# Patient Record
Sex: MALE | Race: WHITE | HISPANIC OR LATINO | Employment: FULL TIME | URBAN - METROPOLITAN AREA
[De-identification: names, ages, dates, MRNs, and addresses within clinical notes are randomized per-mention and may not be internally consistent; named-entity substitution may affect disease eponyms.]

---
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
## 2021-03-24 ENCOUNTER — OFFICE VISIT (OUTPATIENT)
Dept: FAMILY MEDICINE CLINIC | Facility: CLINIC | Age: 21
End: 2021-03-24

## 2021-03-24 VITALS
DIASTOLIC BLOOD PRESSURE: 64 MMHG | HEIGHT: 69 IN | TEMPERATURE: 96.8 F | SYSTOLIC BLOOD PRESSURE: 120 MMHG | WEIGHT: 134 LBS | HEART RATE: 66 BPM | OXYGEN SATURATION: 99 % | BODY MASS INDEX: 19.85 KG/M2 | RESPIRATION RATE: 16 BRPM

## 2021-03-24 DIAGNOSIS — Z00.00 ANNUAL PHYSICAL EXAM: Primary | ICD-10-CM

## 2021-03-24 DIAGNOSIS — Z02.4 DRIVER'S PERMIT PE (PHYSICAL EXAMINATION): ICD-10-CM

## 2021-03-24 PROCEDURE — 1036F TOBACCO NON-USER: CPT | Performed by: FAMILY MEDICINE

## 2021-03-24 PROCEDURE — 99499 UNLISTED E&M SERVICE: CPT | Performed by: FAMILY MEDICINE

## 2021-03-24 PROCEDURE — 3725F SCREEN DEPRESSION PERFORMED: CPT | Performed by: FAMILY MEDICINE

## 2021-03-24 PROCEDURE — 3008F BODY MASS INDEX DOCD: CPT | Performed by: FAMILY MEDICINE

## 2021-03-24 NOTE — PATIENT INSTRUCTIONS

## 2021-03-24 NOTE — PROGRESS NOTES
[facility]    NAME: Hu Burkett  AGE: 24 y o  SEX: male  : 2000     DATE: 3/29/2021     Assessment and Plan:     Problem List Items Addressed This Visit     None      Visit Diagnoses     Annual physical exam    -  Primary    Body mass index (BMI) of 19 0 to 19 9 in adult        's permit PE (physical examination)          Form filled out and provided to patient  Immunizations and preventive care screenings were discussed with patient today  Appropriate education was printed on patient's after visit summary  Counseling:  Alcohol/drug use: discussed no alcohol intake while underage, and avoidance of illicit drug use  Dental Health: discussed importance of regular tooth brushing, flossing, and 2x/ year dental visits  Injury prevention: discussed safety/seat belts, safety helmets, smoke detectors, carbon dioxide detectors, and smoking near bedding or upholstery  Sexual health: discussed sexually transmitted diseases, partner selection, use of condoms, avoidance of unintended pregnancy, and contraceptive alternatives  · Exercise: the importance of regular exercise/physical activity was discussed  Recommend exercise 3-5 times per week for at least 30 minutes  Return in about 1 year (around 3/24/2022) for Annual Wellness Visit  Chief Complaint:     Chief Complaint   Patient presents with    Physical Exam     drivers license      History of Present Illness:     Adult Annual Physical   Patient here for a comprehensive physical exam  The patient reports no problems  Diet and Physical Activity  · Diet/Nutrition: water, rice, beans, chicken predominantly  · Exercise: no formal exercise  Walks at work at [employer]       Depression Screening  PHQ-9 Depression Screening    PHQ-9:   Frequency of the following problems over the past two weeks:      Little interest or pleasure in doing things: 0 - not at all  Feeling down, depressed, or hopeless: 0 - not at all  PHQ-2 Score: 0       General Health  · Sleep: sleeps well and gets 7-8 hours of sleep on average  · Hearing: normal - bilateral   · Vision: no vision problems  · Dental: no dental visits for >1 year, brushes teeth twice daily and flosses teeth occasionally   Health  · No girlfriend, not sexually active  · History of STDs?: no   · Urination normal, BMs 2x / roberth     Review of Systems:     Review of Systems   Constitutional: Negative for chills, fatigue and fever  HENT: Negative for congestion and sore throat  Eyes: Negative for pain and redness  Respiratory: Negative for cough and shortness of breath  Cardiovascular: Negative for chest pain and palpitations  Gastrointestinal: Negative for diarrhea, nausea and vomiting  Endocrine: Negative for cold intolerance and heat intolerance  Genitourinary: Negative for decreased urine volume, frequency and urgency  Musculoskeletal: Negative for arthralgias and back pain  Skin: Negative for rash and wound  Neurological: Negative for dizziness and headaches  Psychiatric/Behavioral: Negative for dysphoric mood and suicidal ideas        Past Medical History:     Past Medical History:   Diagnosis Date    Heart murmur     Hypoglycemia       Past Surgical History:     Past Surgical History:   Procedure Laterality Date    KNEE ASPIRATION  2013      Social History:     E-Cigarette/Vaping    E-Cigarette Use Never User      E-Cigarette/Vaping Substances    Nicotine No     THC No     CBD No     Flavoring No     Other No     Unknown No      Social History     Socioeconomic History    Marital status: Single     Spouse name: None    Number of children: None    Years of education: None    Highest education level: None   Occupational History    None   Social Needs    Financial resource strain: Not hard at all   [email or web address] insecurity     Worry: Never true     Inability: Never true    Transportation needs     Medical: No     Non-medical: No   Tobacco Use    Smoking status: Passive Smoke Exposure - Never Smoker    Smokeless tobacco: Never Used   Substance and Sexual Activity    Alcohol use: No    Drug use: No    Sexual activity: Not Currently     Partners: Female   Lifestyle    Physical activity     Days per week: None     Minutes per session: None    Stress: None   Relationships    Social connections     Talks on phone: None     Gets together: None     Attends Buddhism service: None     Active member of club or organization: None     Attends meetings of clubs or organizations: None     Relationship status: None    Intimate partner violence     Fear of current or ex partner: None     Emotionally abused: None     Physically abused: None     Forced sexual activity: None   Other Topics Concern    None   Social History Narrative    None      Family History:     Family History   Problem Relation Age of Onset    Seizures Mother     Diabetes Mother     Heart murmur Father     Hypertension Father     Diabetes Father     Diabetes Maternal Grandfather     Diabetes Paternal Grandfather     Cancer Cousin       Current Medications:     No current outpatient medications on file  No current facility-administered medications for this visit  Allergies:     No Known Allergies   Physical Exam:     /64 (BP Location: Left arm, Patient Position: Sitting, Cuff Size: Standard)   Pulse 66   Temp (!) 96 8 °F (36 °C) (Temporal)   Resp 16   Ht 5' 9" (1 753 m)   Wt 60 8 kg (134 lb)   SpO2 99%   BMI 19 79 kg/m²     Physical Exam  Vitals signs reviewed  Constitutional:       General: He is not in acute distress  Appearance: Normal appearance  He is not ill-appearing  Comments: Thin for age   HENT:      Head: Normocephalic and atraumatic  Right Ear: Tympanic membrane, ear canal and external ear normal  There is no impacted cerumen        Left Ear: Tympanic membrane, ear canal and external ear normal  There is no impacted cerumen  Nose: Nose normal  No congestion or rhinorrhea  Mouth/Throat:      Mouth: Mucous membranes are moist       Pharynx: Oropharynx is clear  No oropharyngeal exudate or posterior oropharyngeal erythema  Eyes:      General: No scleral icterus  Right eye: No discharge  Left eye: No discharge  Conjunctiva/sclera: Conjunctivae normal    Neck:      Musculoskeletal: Normal range of motion and neck supple  No neck rigidity  Cardiovascular:      Rate and Rhythm: Normal rate and regular rhythm  Pulses: Normal pulses  Heart sounds: Normal heart sounds  No murmur  No friction rub  No gallop  Pulmonary:      Effort: Pulmonary effort is normal  No respiratory distress  Breath sounds: Normal breath sounds  No stridor  No wheezing  Abdominal:      General: Abdomen is flat  Bowel sounds are normal  There is no distension  Palpations: Abdomen is soft  There is no mass  Tenderness: There is no abdominal tenderness  There is no right CVA tenderness or left CVA tenderness  Musculoskeletal: Normal range of motion  General: No swelling or tenderness  Right lower leg: No edema  Left lower leg: No edema  Lymphadenopathy:      Cervical: No cervical adenopathy  Skin:     General: Skin is warm and dry  Capillary Refill: Capillary refill takes less than 2 seconds  Coloration: Skin is not jaundiced or pale  Neurological:      Mental Status: He is alert and oriented to person, place, and time  Coordination: Coordination normal       Gait: Gait normal    Psychiatric:         Mood and Affect: Mood normal          Behavior: Behavior normal          Thought Content:  Thought content normal          Judgment: Judgment normal           Gael Corey DO   [address] [place] [address]

## 2022-01-02 ENCOUNTER — HOSPITAL ENCOUNTER (EMERGENCY)
Facility: HOSPITAL | Age: 22
Discharge: HOME | End: 2022-01-02
Attending: EMERGENCY MEDICINE
Payer: COMMERCIAL

## 2022-01-02 VITALS
DIASTOLIC BLOOD PRESSURE: 98 MMHG | HEART RATE: 103 BPM | RESPIRATION RATE: 18 BRPM | BODY MASS INDEX: 19.26 KG/M2 | OXYGEN SATURATION: 98 % | SYSTOLIC BLOOD PRESSURE: 160 MMHG | WEIGHT: 130 LBS | TEMPERATURE: 98.9 F | HEIGHT: 69 IN

## 2022-01-02 DIAGNOSIS — Z11.52 ENCOUNTER FOR SCREENING FOR COVID-19: ICD-10-CM

## 2022-01-02 DIAGNOSIS — B34.9 VIRAL ILLNESS: Primary | ICD-10-CM

## 2022-01-02 PROCEDURE — 87636 SARSCOV2 & INF A&B AMP PRB: CPT | Performed by: EMERGENCY MEDICINE

## 2022-01-02 PROCEDURE — 99284 EMERGENCY DEPT VISIT MOD MDM: CPT | Performed by: EMERGENCY MEDICINE

## 2022-01-02 PROCEDURE — 99281 EMR DPT VST MAYX REQ PHY/QHP: CPT

## 2022-01-02 NOTE — ED ATTENDING ATTESTATION
1/2/2022  Robby Cardoza DO, saw and evaluated the patient  I have discussed the patient with the resident/non-physician practitioner and agree with the resident's/non-physician practitioner's findings, Plan of Care, and MDM as documented in the resident's/non-physician practitioner's note, except where noted  All available labs and Radiology studies were reviewed  I was present for key portions of any procedure(s) performed by the resident/non-physician practitioner and I was immediately available to provide assistance  At this point I agree with the current assessment done in the Emergency Department  I have conducted an independent evaluation of this patient a history and physical is as follows:      23 yo male c/o 2d hx fever, cough, sore throat  Vaccinated x 3 against COVID  Needs swab for work  SpO2 98% in RA    Will send multiplex swab, call with results  Isolate in the meantime        ED Course         Critical Care Time  Procedures

## 2022-01-02 NOTE — ED PROVIDER NOTES
History  Chief Complaint   Patient presents with    Labs Only     Pt c/o fever and sore throat  Needs swab for his job  HPI: Patient is a 22 y o  male who presents with 2 days of fever, cough and sore throat which the patient describes as mild  The cough is a dry cough  Patient reports subjective fevers, but has not taken a temperature  The patient has had contact with people with similar symptoms  The patient has not taken any medication  Patient received 3 COVID vaccinations and no flu vaccination  No Known Allergies    Past Medical History:  No date: Heart murmur  No date: Hypoglycemia   Past Surgical History:  2013: KNEE ASPIRATION  Social History    Tobacco Use      Smoking status: Passive Smoke Exposure - Never Smoker      Smokeless tobacco: Never Used    Vaping Use      Vaping Use: Never used    Alcohol use: Yes    Drug use: No      Nursing notes reviewed  Physical Exam:  ED Triage Vitals (01/02/22 0102)  Temperature: 98 9 °F (37 2 °C)  Pulse: 103  Respirations: 18  Blood Pressure: 160/98  SpO2: 98 %  Temp Source: Oral  Heart Rate Source: Monitor  Patient Position - Orthostatic VS: Sitting  BP Location: Left arm  FiO2 (%): n/a  Pain Score: 5    ROS: Positive for fever, cough, and sore throat, the remainder of a 10 organ system ROS was otherwise unremarkable  General: awake, alert, no acute distress  Head: normocephalic, atraumatic  Eyes: no scleral icterus  Ears: external ears normal, hearing grossly intact  Nose: external exam grossly normal, negative nasal discharge  Neck: symmetric, No JVD noted, trachea midline, erythema of throat noted, no exudate  Pulmonary: no respiratory distress, no tachypnea noted  Cardiovascular: appears well perfused  Abdomen: no distention noted  Musculoskeletal: no deformities noted, tone normal  Neuro: grossly non-focal  Psych: mood and affect appropriate    The patient is stable and has a history and physical exam consistent with a viral illness   COVID19/flu testing has been performed  I considered the patient's other medical conditions as applicable/noted above in my medical decision making  The patient is stable upon discharge  The plan is for supportive care at home  Patient was given Decadron  The patient (and any family present) verbalized understanding of the discharge instructions and warnings that would necessitate return to the Emergency Department  All questions were answered prior to discharge  Medications  dexamethasone oral liquid 10 mg 1 mL (has no administration in time range)            None       Past Medical History:   Diagnosis Date    Heart murmur     Hypoglycemia        Past Surgical History:   Procedure Laterality Date    KNEE ASPIRATION  2013       Family History   Problem Relation Age of Onset    Seizures Mother     Diabetes Mother     Heart murmur Father     Hypertension Father     Diabetes Father     Diabetes Maternal Grandfather     Diabetes Paternal Grandfather     Cancer Cousin      I have reviewed and agree with the history as documented  E-Cigarette/Vaping    E-Cigarette Use Never User      E-Cigarette/Vaping Substances    Nicotine No     THC No     CBD No     Flavoring No     Other No     Unknown No      Social History     Tobacco Use    Smoking status: Passive Smoke Exposure - Never Smoker    Smokeless tobacco: Never Used   Vaping Use    Vaping Use: Never used   Substance Use Topics    Alcohol use:  Yes    Drug use: No        Review of Systems    Physical Exam  ED Triage Vitals [01/02/22 0102]   Temperature Pulse Respirations Blood Pressure SpO2   98 9 °F (37 2 °C) 103 18 160/98 98 %      Temp Source Heart Rate Source Patient Position - Orthostatic VS BP Location FiO2 (%)   Oral Monitor Sitting Left arm --      Pain Score       5             Orthostatic Vital Signs  Vitals:    01/02/22 0102   BP: 160/98   Pulse: 103   Patient Position - Orthostatic VS: Sitting       Physical Exam    ED Medications  Medications   dexamethasone oral liquid 10 mg 1 mL (has no administration in time range)       Diagnostic Studies  Results Reviewed     Procedure Component Value Units Date/Time    COVID/FLU - 24 hour TAT [[id number]] Updated: 01/02/22 0233    Lab Status: In process Specimen: Nares from Nose                  No orders to display         Procedures  Procedures      ED Course                                       MDM  Number of Diagnoses or Management Options  Encounter for screening for COVID-19: established and improving  Viral illness: established and improving     Amount and/or Complexity of Data Reviewed  Clinical lab tests: ordered and reviewed  Review and summarize past medical records: yes  Discuss the patient with other providers: yes    Risk of Complications, Morbidity, and/or Mortality  Presenting problems: low  Diagnostic procedures: low  Management options: low    Patient Progress  Patient progress: improved    Recommend supportive care at home  Follow-up with PCP  Return precautions given  Patient was given information on quarantine if his COVID test does come back positive  Disposition  Final diagnoses:   Viral illness   Encounter for screening for COVID-19     Time reflects when diagnosis was documented in both MDM as applicable and the Disposition within this note     Time User Action Codes Description Comment    1/2/2022  2:34 AM Kyle Harrington Add [B34 9] Viral illness     1/2/2022  2:34 AM Kyle Harrington Add [Z11 52] Encounter for screening for COVID-19       ED Disposition     ED Disposition Condition Date/Time Comment    Discharge Good Sun Jan 2, 2022  2:34 AM [address] 219 discharge to home/self care              Follow-up Information     Follow up With Specialties Details Why Contact Info Additional [address]   [address], [address] [facility] [address]  [facility] [address] [facility], [address], [address] [place], [place], [address]          There are no discharge medications for this patient  No discharge procedures on file  PDMP Review     None           ED Provider  Attending physically available and evaluated Corazon Brandon I managed the patient along with the ED Attending      Electronically Signed by         Veena Hargrove DO  01/02/22 5459

## 2022-01-02 NOTE — DISCHARGE INSTRUCTIONS
You have been seen for a viral illness  You should return to the ED if you develop trouble breathing, dehydration, or other worsening symptoms  Follow up with your primary care physician  Take Motrin or Advil for symptoms  If your COVID test is positive, you will need to quarantine for 5 days beginning when your symptoms started and then can leave your house with a mask for the next 5 days if you have been fever free for 24 hours

## 2022-01-02 NOTE — Clinical Note
Gaurav Murillo was seen and treated in our emergency department on 1/2/2022  Diagnosis:     Dayanna Rodriges  may return to work on return date  He may return on this date: 01/06/2022    Patient may return to work on this day if he has been fever free for 24 hours  He must wear mask for the next 5 days  If you have any questions or concerns, please don't hesitate to call        Thierno Check, DO    ______________________________           _______________          _______________  Hospital Representative                              Date                                Time

## 2022-01-06 LAB
FLUAV RNA RESP QL NAA+PROBE: NEGATIVE
FLUBV RNA RESP QL NAA+PROBE: NEGATIVE
SARS-COV-2 RNA RESP QL NAA+PROBE: NEGATIVE

## 2022-01-08 ENCOUNTER — OFFICE VISIT (OUTPATIENT)
Dept: URGENT CARE | Age: 22
End: 2022-01-08
Payer: COMMERCIAL

## 2022-01-08 VITALS
WEIGHT: 130 LBS | HEIGHT: 69 IN | TEMPERATURE: 98.3 F | RESPIRATION RATE: 20 BRPM | BODY MASS INDEX: 19.26 KG/M2 | OXYGEN SATURATION: 99 % | HEART RATE: 101 BPM

## 2022-01-08 DIAGNOSIS — J02.9 ACUTE PHARYNGITIS, UNSPECIFIED ETIOLOGY: ICD-10-CM

## 2022-01-08 DIAGNOSIS — J32.9 SINUSITIS, UNSPECIFIED CHRONICITY, UNSPECIFIED LOCATION: Primary | ICD-10-CM

## 2022-01-08 DIAGNOSIS — Z20.822 ENCOUNTER FOR LABORATORY TESTING FOR COVID-19 VIRUS: ICD-10-CM

## 2022-01-08 PROCEDURE — 99213 OFFICE O/P EST LOW 20 MIN: CPT | Performed by: PHYSICIAN ASSISTANT

## 2022-01-08 PROCEDURE — U0003 INFECTIOUS AGENT DETECTION BY NUCLEIC ACID (DNA OR RNA); SEVERE ACUTE RESPIRATORY SYNDROME CORONAVIRUS 2 (SARS-COV-2) (CORONAVIRUS DISEASE [COVID-19]), AMPLIFIED PROBE TECHNIQUE, MAKING USE OF HIGH THROUGHPUT TECHNOLOGIES AS DESCRIBED BY CMS-2020-01-R: HCPCS | Performed by: PHYSICIAN ASSISTANT

## 2022-01-08 PROCEDURE — U0005 INFEC AGEN DETEC AMPLI PROBE: HCPCS | Performed by: PHYSICIAN ASSISTANT

## 2022-01-08 RX ORDER — AMOXICILLIN 500 MG/1
500 CAPSULE ORAL EVERY 8 HOURS SCHEDULED
Qty: 30 CAPSULE | Refills: 0 | Status: SHIPPED | OUTPATIENT
Start: 2022-01-08 | End: 2022-01-18

## 2022-01-08 NOTE — PROGRESS NOTES
[facility]        NAME: Yoko Vera is a 22 y o  male  : 2000    MRN: [id number]  DATE: 2022  TIME: 8:41 AM    Pulse 101   Temp 98 3 °F (36 8 °C) (Temporal)   Resp 20   Ht 5' 9" (1 753 m)   Wt 59 kg (130 lb)   SpO2 99%   BMI 19 20 kg/m²     Assessment and Plan   Sinusitis, unspecified chronicity, unspecified location [J32 9]  1  Sinusitis, unspecified chronicity, unspecified location  COVID Only -Office Collect    amoxicillin (AMOXIL) 500 mg capsule   2  Acute pharyngitis, unspecified etiology  amoxicillin (AMOXIL) 500 mg capsule   3  Encounter for laboratory testing for COVID-19 virus  amoxicillin (AMOXIL) 500 mg capsule         Patient Instructions       Follow up with PCP in 3-5 days  Proceed to  ER if symptoms worsen  Chief Complaint     Chief Complaint   Patient presents with    Sore Throat     sore throat, fever, cough x 2 weeks         History of Present Illness       Pt with sore throat and nasal congestion getting worse over several weeks       Review of Systems   Review of Systems   Constitutional: Negative  HENT: Positive for congestion, sinus pressure, sinus pain and sore throat  Eyes: Negative  Respiratory: Negative  Cardiovascular: Negative  Gastrointestinal: Negative  Endocrine: Negative  Genitourinary: Negative  Musculoskeletal: Negative  Skin: Negative  Allergic/Immunologic: Negative  Neurological: Negative  Hematological: Negative  Psychiatric/Behavioral: Negative  All other systems reviewed and are negative          Current Medications       Current Outpatient Medications:     amoxicillin (AMOXIL) 500 mg capsule, Take 1 capsule (500 mg total) by mouth every 8 (eight) hours for 10 days, Disp: 30 capsule, Rfl: 0    Current Allergies     Allergies as of 2022    (No Known Allergies)            The following portions of the patient's history were reviewed and updated as appropriate: allergies, current medications, past family history, past medical history, past social history, past surgical history and problem list      Past Medical History:   Diagnosis Date    Heart murmur     Hypertension     Hypoglycemia        Past Surgical History:   Procedure Laterality Date    KNEE ASPIRATION  2013       Family History   Problem Relation Age of Onset    Seizures Mother     Diabetes Mother     Heart murmur Father     Hypertension Father     Diabetes Father     Diabetes Maternal Grandfather     Diabetes Paternal Grandfather     Cancer Cousin          Medications have been verified  Objective   Pulse 101   Temp 98 3 °F (36 8 °C) (Temporal)   Resp 20   Ht 5' 9" (1 753 m)   Wt 59 kg (130 lb)   SpO2 99%   BMI 19 20 kg/m²        Physical Exam     Physical Exam  Vitals and nursing note reviewed  Constitutional:       Appearance: Normal appearance  He is normal weight  HENT:      Head: Normocephalic and atraumatic  Right Ear: Tympanic membrane, ear canal and external ear normal       Left Ear: Tympanic membrane, ear canal and external ear normal       Nose: Congestion and rhinorrhea present  Comments: Boggy nasal mucosa  Max sinus tender to palp     Mouth/Throat:      Pharynx: Posterior oropharyngeal erythema present  Eyes:      Extraocular Movements: Extraocular movements intact  Conjunctiva/sclera: Conjunctivae normal       Pupils: Pupils are equal, round, and reactive to light  Cardiovascular:      Rate and Rhythm: Normal rate and regular rhythm  Pulses: Normal pulses  Heart sounds: Normal heart sounds  Pulmonary:      Effort: Pulmonary effort is normal       Breath sounds: Normal breath sounds  Abdominal:      General: Abdomen is flat  Palpations: Abdomen is soft  Musculoskeletal:         General: Normal range of motion  Cervical back: Normal range of motion and neck supple  Lymphadenopathy:      Cervical: Cervical adenopathy present     Skin: General: Skin is warm  Capillary Refill: Capillary refill takes less than 2 seconds  Neurological:      General: No focal deficit present  Mental Status: He is alert and oriented to person, place, and time     Psychiatric:         Mood and Affect: Mood normal          Behavior: Behavior normal

## 2022-01-08 NOTE — LETTER
January 8, 2022     Patient: Talita Smart   YOB: 2000   Date of Visit: 1/8/2022       To Whom It May Concern: It is my medical opinion that Ember Spare should remain out of work until test results are back and are negative   If you have any questions or concerns, please don't hesitate to call           Sincerely,        Brandt Alfaro PA-C    CC: No Recipients

## 2022-01-08 NOTE — PATIENT INSTRUCTIONS
[address]    Your healthcare provider and/or public health staff have evaluated you and have determined that you do not need to remain in the hospital at this time  At this time you can be isolated at home where you will be monitored by staff from your local or state health department  You should carefully follow the prevention and isolation steps below until a healthcare provider or local or state health department says that you can return to your normal activities  Stay home except to get medical care    People who are mildly ill with COVID-19 are able to isolate at home during their illness  You should restrict activities outside your home, except for getting medical care  Do not go to work, school, or public areas  Avoid using public transportation, ride-sharing, or taxis  Separate yourself from other people and animals in your home    People: As much as possible, you should stay in a specific room and away from other people in your home  Also, you should use a separate bathroom, if available  Animals: You should restrict contact with pets and other animals while you are sick with COVID-19, just like you would around other people  Although there have not been reports of pets or other animals becoming sick with COVID-19, it is still recommended that people sick with COVID-19 limit contact with animals until more information is known about the virus  When possible, have another member of your household care for your animals while you are sick  If you are sick with COVID-19, avoid contact with your pet, including petting, snuggling, being kissed or licked, and sharing food  If you must care for your pet or be around animals while you are sick, wash your hands before and after you interact with pets and wear a facemask  See COVID-19 and Animals for more information      Call ahead before visiting your doctor    If you have a medical appointment, call the healthcare provider and tell them that you have or may have COVID-19  This will help the healthcare providers office take steps to keep other people from getting infected or exposed  Wear a facemask    You should wear a facemask when you are around other people (e g , sharing a room or vehicle) or pets and before you enter a healthcare providers office  If you are not able to wear a facemask (for example, because it causes trouble breathing), then people who live with you should not stay in the same room with you, or they should wear a facemask if they enter your room  Cover your coughs and sneezes    Cover your mouth and nose with a tissue when you cough or sneeze  Throw used tissues in a lined trash can  Immediately wash your hands with soap and water for at least 20 seconds or, if soap and water are not available, clean your hands with an alcohol-based hand  that contains at least 60% alcohol  Clean your hands often    Wash your hands often with soap and water for at least 20 seconds, especially after blowing your nose, coughing, or sneezing; going to the bathroom; and before eating or preparing food  If soap and water are not readily available, use an alcohol-based hand  with at least 60% alcohol, covering all surfaces of your hands and rubbing them together until they feel dry  Soap and water are the best option if hands are visibly dirty  Avoid touching your eyes, nose, and mouth with unwashed hands  Avoid sharing personal household items    You should not share dishes, drinking glasses, cups, eating utensils, towels, or bedding with other people or pets in your home  After using these items, they should be washed thoroughly with soap and water  Clean all high-touch surfaces everyday    High touch surfaces include counters, tabletops, doorknobs, bathroom fixtures, toilets, phones, keyboards, tablets, and bedside tables  Also, clean any surfaces that may have blood, stool, or body fluids on them   Use a household cleaning spray or wipe, according to the label instructions  Labels contain instructions for safe and effective use of the cleaning product including precautions you should take when applying the product, such as wearing gloves and making sure you have good ventilation during use of the product  Monitor your symptoms    Seek prompt medical attention if your illness is worsening (e g , difficulty breathing)  Before seeking care, call your healthcare provider and tell them that you have, or are being evaluated for, COVID-19  Put on a facemask before you enter the facility  These steps will help the healthcare providers office to keep other people in the office or waiting room from getting infected or exposed  Ask your healthcare provider to call the local or [facility] health department  Persons who are placed under active monitoring or facilitated self-monitoring should follow instructions provided by their local health department or occupational health professionals, as appropriate  If you have a medical emergency and need to call 911, notify the dispatch personnel that you have, or are being evaluated for COVID-19  If possible, put on a facemask before emergency medical services arrive      Discontinuing home isolation    Patients with confirmed COVID-19 should remain under home isolation precautions until the following conditions are met:   - They have had no fever for at least 24 hours (that is one full day of no fever without the use medicine that reduces fevers)  AND  - other symptoms have improved (for example, when their cough or shortness of breath have improved)  AND  - If had mild or moderate illness, at least 10 days have passed since their symptoms first appeared or if severe illness (needed oxygen) or immunosuppressed, at least 20 days have passed since symptoms first appeared  Patients with confirmed COVID-19 should also notify close contacts (including their workplace) and ask that they self-quarantine  Currently, close contact is defined as being within 6 feet for 15 minutes or more from the period 24 hours starting 48 hours before symptom onset to the time at which the patient went into isolation  Close contacts of patients diagnosed with COVID-19 should be instructed by the patient to self-quarantine for 14 days from the last time of their last contact with the patient  Source: [employer]  Sinusitis   WHAT YOU NEED TO KNOW:   Sinusitis is inflammation or infection of your sinuses  Sinusitis is most often caused by a virus  Acute sinusitis may last up to 12 weeks  Chronic sinusitis lasts longer than 12 weeks  Recurrent sinusitis means you have 4 or more infections in 1 year  DISCHARGE INSTRUCTIONS:   Return to the emergency department if:   · You have trouble breathing or wheezing that is getting worse  · You have a stiff neck, a fever, or a bad headache  · You cannot open your eye  · Your eyeball bulges out or you cannot move your eye  · You are more sleepy than normal, or you notice changes in your ability to think, move, or talk  · You have swelling of your forehead or scalp  Call your doctor if:   · You have vision changes, such as double vision  · Your eye and eyelid are red, swollen, and painful  · Your symptoms do not improve or go away after 10 days  · You have nausea and are vomiting  · Your nose is bleeding  · You have questions or concerns about your condition or care  Medicines: Your symptoms may go away on their own  Your healthcare provider may recommend watchful waiting for up to 10 days before starting antibiotics  You may need any of the following:  · Acetaminophen  decreases pain and fever  It is available without a doctor's order  Ask how much to take and how often to take it  Follow directions   Read the labels of all other medicines you are using to see if they also contain acetaminophen, or ask your doctor or pharmacist  Acetaminophen can cause liver damage if not taken correctly  Do not use more than 4 grams (4,000 milligrams) total of acetaminophen in one day  · NSAIDs , such as ibuprofen, help decrease swelling, pain, and fever  This medicine is available with or without a doctor's order  NSAIDs can cause stomach bleeding or kidney problems in certain people  If you take blood thinner medicine, always ask your healthcare provider if NSAIDs are safe for you  Always read the medicine label and follow directions  · Nasal steroid sprays  may help decrease inflammation in your nose and sinuses  · Decongestants  help reduce swelling and drain mucus in the nose and sinuses  They may help you breathe easier  · Antihistamines  help dry mucus in the nose and relieve sneezing  · Antibiotics  help treat or prevent a bacterial infection  · Take your medicine as directed  Contact your healthcare provider if you think your medicine is not helping or if you have side effects  Tell him or her if you are allergic to any medicine  Keep a list of the medicines, vitamins, and herbs you take  Include the amounts, and when and why you take them  Bring the list or the pill bottles to follow-up visits  Carry your medicine list with you in case of an emergency  Self-care:   · Rinse your sinuses as directed  Use a sinus rinse device to rinse your nasal passages with a saline (salt water) solution or distilled water  Do not use tap water  This will help thin the mucus in your nose and rinse away pollen and dirt  It will also help reduce swelling so you can breathe normally  · Use a humidifier  to increase air moisture in your home  This may make it easier for you to breathe and help decrease your cough  · Sleep with your head elevated  Place an extra pillow under your head before you go to sleep to help your sinuses drain  · Drink liquids as directed    Ask your healthcare provider how much liquid to drink each day and which liquids are best for you  Liquids will thin the mucus in your nose and help it drain  Avoid drinks that contain alcohol or caffeine  · Do not smoke, and avoid secondhand smoke  Nicotine and other chemicals in cigarettes and cigars can make your symptoms worse  Ask your healthcare provider for information if you currently smoke and need help to quit  E-cigarettes or smokeless tobacco still contain nicotine  Talk to your healthcare provider before you use these products  Prevent the spread of germs:   · Wash your hands often with soap and water  Wash your hands after you use the bathroom, change a child's diaper, or sneeze  Wash your hands before you prepare or eat food  · Stay away from people who are sick  Some germs spread easily and quickly through contact  Follow up with your doctor as directed: You may be referred to an ear, nose, and throat specialist  Write down your questions so you remember to ask them during your visits  © Copyright [employer] 2021 Information is for End User's use only and may not be sold, redistributed or otherwise used for commercial purposes  All illustrations and images included in CareNotes® are the copyrighted property of A D A M , Inc  or [facility]   The above information is an  only  It is not intended as medical advice for individual conditions or treatments  Talk to your doctor, nurse or pharmacist before following any medical regimen to see if it is safe and effective for you  Pharyngitis   WHAT YOU NEED TO KNOW:   Pharyngitis, or sore throat, is inflammation of the tissues and structures in your pharynx (throat)  Pharyngitis is most often caused by bacteria  It may also be caused by a cold or flu virus  Other causes include smoking, allergies, or acid reflux     DISCHARGE INSTRUCTIONS:   Call 911 for any of the following:   · You have trouble breathing or swallowing because your throat is swollen or sore  Return to the emergency department if:   · You are drooling because it hurts too much to swallow  · Your fever is higher than 102? F (39?C) or lasts longer than 3 days  · You are confused  · You taste blood in your throat  Contact your healthcare provider if:   · Your throat pain gets worse  · You have a painful lump in your throat that does not go away after 5 days  · Your symptoms do not improve after 5 days  · You have questions or concerns about your condition or care  Medicines:  Viral pharyngitis will go away on its own without treatment  Your sore throat should start to feel better in 3 to 5 days for both viral and bacterial infections  You may need any of the following:  · Antibiotics  treat a bacterial infection  · NSAIDs , such as ibuprofen, help decrease swelling, pain, and fever  NSAIDs can cause stomach bleeding or kidney problems in certain people  If you take blood thinner medicine, always ask your healthcare provider if NSAIDs are safe for you  Always read the medicine label and follow directions  · Acetaminophen  decreases pain and fever  It is available without a doctor's order  Ask how much to take and how often to take it  Follow directions  Acetaminophen can cause liver damage if not taken correctly  · Take your medicine as directed  Contact your healthcare provider if you think your medicine is not helping or if you have side effects  Tell him or her if you are allergic to any medicine  Keep a list of the medicines, vitamins, and herbs you take  Include the amounts, and when and why you take them  Bring the list or the pill bottles to follow-up visits  Carry your medicine list with you in case of an emergency  Manage your symptoms:   · Gargle salt water  Mix ¼ teaspoon salt in an 8 ounce glass of warm water and gargle  This may help decrease swelling in your throat  · Drink liquids as directed    You may need to drink more liquids than usual  Liquids may help soothe your throat and prevent dehydration  Ask how much liquid to drink each day and which liquids are best for you  · Use a cool-steam humidifier  to help moisten the air in your room and calm your cough  · Soothe your throat  with cough drops, ice, soft foods, or popsicles  Prevent the spread of pharyngitis:  Cover your mouth and nose when you cough or sneeze  Do not share food or drinks  Wash your hands often  Use soap and water  If soap and water are unavailable, use an alcohol based hand   Follow up with your doctor as directed:  Write down your questions so you remember to ask them during your visits  © Copyright [employer] 2021 Information is for End User's use only and may not be sold, redistributed or otherwise used for commercial purposes  All illustrations and images included in CareNotes® are the copyrighted property of A D A M , Inc  or [facility] Radha Sherman   The above information is an  only  It is not intended as medical advice for individual conditions or treatments  Talk to your doctor, nurse or pharmacist before following any medical regimen to see if it is safe and effective for you

## 2022-01-10 LAB — SARS-COV-2 RNA RESP QL NAA+PROBE: NEGATIVE

## 2022-02-08 ENCOUNTER — HOSPITAL ENCOUNTER (EMERGENCY)
Facility: HOSPITAL | Age: 22
Discharge: HOME | End: 2022-02-08
Attending: EMERGENCY MEDICINE | Admitting: EMERGENCY MEDICINE
Payer: COMMERCIAL

## 2022-02-08 ENCOUNTER — APPOINTMENT (EMERGENCY)
Dept: RADIOLOGY | Facility: HOSPITAL | Age: 22
End: 2022-02-08
Payer: COMMERCIAL

## 2022-02-08 VITALS
HEART RATE: 88 BPM | DIASTOLIC BLOOD PRESSURE: 76 MMHG | OXYGEN SATURATION: 99 % | RESPIRATION RATE: 20 BRPM | TEMPERATURE: 97.9 F | SYSTOLIC BLOOD PRESSURE: 142 MMHG
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
## 2022-02-08 DIAGNOSIS — M25.512 LEFT SHOULDER PAIN: ICD-10-CM

## 2022-02-08 DIAGNOSIS — Z20.822 ENCOUNTER FOR SCREENING LABORATORY TESTING FOR COVID-19 VIRUS IN ASYMPTOMATIC PATIENT: ICD-10-CM

## 2022-02-08 PROCEDURE — U0003 INFECTIOUS AGENT DETECTION BY NUCLEIC ACID (DNA OR RNA); SEVERE ACUTE RESPIRATORY SYNDROME CORONAVIRUS 2 (SARS-COV-2) (CORONAVIRUS DISEASE [COVID-19]), AMPLIFIED PROBE TECHNIQUE, MAKING USE OF HIGH THROUGHPUT TECHNOLOGIES AS DESCRIBED BY CMS-2020-01-R: HCPCS | Performed by: INTERNAL MEDICINE

## 2022-02-08 PROCEDURE — 93005 ELECTROCARDIOGRAM TRACING: CPT

## 2022-02-08 PROCEDURE — 99284 EMERGENCY DEPT VISIT MOD MDM: CPT

## 2022-02-08 PROCEDURE — U0005 INFEC AGEN DETEC AMPLI PROBE: HCPCS | Performed by: INTERNAL MEDICINE

## 2022-02-08 PROCEDURE — 73030 X-RAY EXAM OF SHOULDER: CPT

## 2022-02-08 PROCEDURE — 99285 EMERGENCY DEPT VISIT HI MDM: CPT | Performed by: EMERGENCY MEDICINE

## 2022-02-08 RX ORDER — LIDOCAINE 50 MG/G
1 PATCH TOPICAL ONCE
Status: DISCONTINUED | OUTPATIENT
Start: 2022-02-08 | End: 2022-02-09 | Stop reason: HOSPADM

## 2022-02-08 RX ORDER — IBUPROFEN 600 MG/1
600 TABLET ORAL ONCE
Status: COMPLETED | OUTPATIENT
Start: 2022-02-08 | End: 2022-02-08

## 2022-02-08 RX ADMIN — IBUPROFEN 600 MG: 600 TABLET, FILM COATED ORAL at 21:32

## 2022-02-08 RX ADMIN — LIDOCAINE 5% 1 PATCH: 700 PATCH TOPICAL at 21:33

## 2022-02-08 NOTE — Clinical Note
Ember Valencia was seen and treated in our emergency department on 2/8/2022  Diagnosis:     Carlo Kylejamesshakeel    He may return on this date:     Patient was tested for COVID on 2/8/22  He will be notified regarding results      If you have any questions or concerns, please don't hesitate to call        Deana Bledsoe MD    ______________________________           _______________          _______________  [facility] Representative                              Date                                Time

## 2022-02-09 LAB
ATRIAL RATE: 78 BPM
P AXIS: 76 DEGREES
PR INTERVAL: 132 MS
QRS AXIS: 89 DEGREES
QRSD INTERVAL: 86 MS
QT INTERVAL: 354 MS
QTC INTERVAL: 403 MS
SARS-COV-2 RNA RESP QL NAA+PROBE: NEGATIVE
T WAVE AXIS: 77 DEGREES
VENTRICULAR RATE: 78 BPM

## 2022-02-09 PROCEDURE — 93010 ELECTROCARDIOGRAM REPORT: CPT | Performed by: INTERNAL MEDICINE
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
## 2022-05-22 ENCOUNTER — TELEPHONE (OUTPATIENT)
Dept: OTHER | Facility: OTHER | Age: 22
End: 2022-05-22

## 2022-05-23 ENCOUNTER — TELEPHONE (OUTPATIENT)
Dept: FAMILY MEDICINE CLINIC | Facility: CLINIC | Age: 22
End: 2022-05-23

## 2022-05-23 NOTE — TELEPHONE ENCOUNTER
Patient no longer interested in being a patient with [employer]  Stated goes to Dr Uzma Mcmanus     Please remove from patient list

## 2022-06-22 NOTE — TELEPHONE ENCOUNTER
06/21/22 10:20 PM     Thank you for your request  Your request has been received, reviewed, and the patient chart updated  The PCP has successfully been removed with a patient attribution note  This message will now be completed      Thank you  Toney Bro

## 2023-01-30 ENCOUNTER — OFFICE VISIT (OUTPATIENT)
Dept: FAMILY MEDICINE CLINIC | Facility: CLINIC | Age: 23
End: 2023-01-30

## 2023-01-30 VITALS
HEART RATE: 110 BPM | SYSTOLIC BLOOD PRESSURE: 136 MMHG | DIASTOLIC BLOOD PRESSURE: 89 MMHG | OXYGEN SATURATION: 98 % | HEIGHT: 69 IN | WEIGHT: 140.4 LBS | BODY MASS INDEX: 20.79 KG/M2 | TEMPERATURE: 98 F

## 2023-01-30 DIAGNOSIS — K59.00 CONSTIPATION, UNSPECIFIED CONSTIPATION TYPE: ICD-10-CM

## 2023-01-30 DIAGNOSIS — S06.0X0A CONCUSSION WITHOUT LOSS OF CONSCIOUSNESS, INITIAL ENCOUNTER: ICD-10-CM

## 2023-01-30 DIAGNOSIS — L73.9 FOLLICULITIS: ICD-10-CM

## 2023-01-30 DIAGNOSIS — L73.9 FOLLICULITIS: Primary | ICD-10-CM

## 2023-01-30 PROBLEM — R23.8 SKIN PIMPLE: Status: ACTIVE | Noted: 2023-01-30

## 2023-01-30 PROBLEM — R23.8 SKIN PIMPLE: Status: RESOLVED | Noted: 2023-01-30 | Resolved: 2023-01-30

## 2023-01-30 NOTE — ASSESSMENT & PLAN NOTE
Pimple located on left base of penis  Likely folliculitis       Plan:  -Clean with benzoyl peroxide before application of mupirocin  -Mupirocin cream for 10 days  -Follow up in 2 weeks

## 2023-01-30 NOTE — ASSESSMENT & PLAN NOTE
Has been having hard stools for the past week, has noticed some blood in stool after straining       Plan:  Counseled to drink more water and eat more fiber  Continue taking Miralax as needed

## 2023-01-30 NOTE — PROGRESS NOTES
Name: Silvina Le      : 2000      MRN: [id number]  Encounter Provider: Gerald Gr DO  Encounter Date: 2023   Encounter department: [facility][note 3]0 [address] [place] [address]    Assessment & Plan     1  Folliculitis  Assessment & Plan:  Pimple located on left base of penis, onset was about 1 month ag  Pt reports picking at it, some pus but no bleeding  Has shaved over it previously  Most likely folliculitis  Plan:  -Clean with benzoyl peroxide before application of mupirocin  -Mupirocin cream 3 times daily for 10 days  -Refrain from shaving or irritating the area  -Follow up in 2 weeks to assess for resolution of infection   -Return sooner if sxs worsen or not improvement  2  Constipation, unspecified constipation type  Assessment & Plan:  Has been having hard stools for the past week, has noticed some blood in stool after straining  Plan:  Counseled to drink more water and eat more fiber  Continue taking Miralax as needed      3  Concussion without loss of consciousness, initial encounter  Assessment & Plan:  Patient hit the left side of his head after falling down the stairs two days ago but denies LOC, headache, vision changes  Reports fatigue and continued tenderness of left side of his head and the lateral right orbit but has not tried any OTC medication    Plan:  Tylenol or Motrin PRN for pain  Decrease screen time  Counseled patient that the pain may take up to 2 weeks to resolve        Subjective      Elizabeth Gray is a 25year-old male presenting for evaluation of pimple at the left base of his penis  He wears tight clothes at work that irritates it  Reports popping the pimple many times  He has used a razor to shave over it several times  He denies any bleeding or pus drainage  Two days ago, he fell down the stairs and hit the left side of his head  No LOC, persistent headache, vision changes, dizzy spells   Reports some pain around the lateral aspect of his left orbit  Left side of his head is   He has not tried Tylenol or Motrin to alleviate the pain  Also complains of some constipation over the last month, last bowel movement was last morning around 9 AM  Reports episodes of bloody stools after straining  Has been taking Miralax for constipation, which has been helping  Review of Systems   Constitutional: Negative for chills and fever  Gastrointestinal: Positive for anal bleeding and constipation  Genitourinary: Positive for genital sores  Neurological: Positive for headaches  No current outpatient medications on file prior to visit  Objective     /89 (BP Location: Right arm, Patient Position: Sitting, Cuff Size: Large)   Pulse (!) 110   Temp 98 °F (36 7 °C) (Temporal)   Ht 5' 8 6" (1 742 m)   Wt 63 7 kg (140 lb 6 4 oz)   SpO2 98%   BMI 20 98 kg/m²     Physical Exam  Constitutional:       General: He is not in acute distress  Appearance: Normal appearance  He is normal weight  He is not toxic-appearing  HENT:      Head: Normocephalic and atraumatic  Right Ear: External ear normal       Left Ear: External ear normal    Eyes:      General: No scleral icterus  Conjunctiva/sclera: Conjunctivae normal    Pulmonary:      Effort: No respiratory distress  Genitourinary:         Comments: Lesion at left lateral base of penis, erythematous, scabbed over; no drainage visible  Musculoskeletal:         General: No deformity  Skin:     General: Skin is warm and dry  Neurological:      General: No focal deficit present  Mental Status: He is alert and oriented to person, place, and time     Psychiatric:         Mood and Affect: Mood normal          Behavior: Behavior normal        Cinthya Ortiz,

## 2023-01-30 NOTE — ASSESSMENT & PLAN NOTE
Patient hit the left side of his head after falling down the stairs two days ago but denies LOC, headache, vision changes  Reports fatigue and continued tenderness of left side of his head and the lateral right orbit but has not tried any OTC medication    Plan:  Tylenol or Motrin PRN for pain  Decrease screen time  Counseled patient that the pain may take up to 2 weeks to resolve

## 2023-01-30 NOTE — ASSESSMENT & PLAN NOTE
Pimple located on left base of penis, onset was about 1 month ag  Pt reports picking at it, some pus but no bleeding  Has shaved over it previously  Most likely folliculitis  Plan:  -Clean with benzoyl peroxide before application of mupirocin  -Mupirocin cream 3 times daily for 10 days  -Refrain from shaving or irritating the area  -Follow up in 2 weeks to assess for resolution of infection   -Return sooner if sxs worsen or not improvement

## 2023-01-30 NOTE — PROGRESS NOTES
Name: Nelly Pritchett      : 2000      MRN: [id number]  Encounter Provider: Rubni Waite DO  Encounter Date: 2023   Encounter department: [address] [place] [address]    Assessment & Plan     1  Folliculitis  Assessment & Plan:  Pimple located on left base of penis, onset was about 1 month ag  Pt reports picking at it, some pus but no bleeding  Has shaved over it previously  Most likely folliculitis  Plan:  -Clean with benzoyl peroxide before application of mupirocin  -Mupirocin cream 3 times daily for 10 days  -Refrain from shaving or irritating the area  -Follow up in 2 weeks to assess for resolution of infection   -Return sooner if sxs worsen or not improvement  2  Constipation, unspecified constipation type  Assessment & Plan:  Has been having hard stools for the past week, has noticed some blood in stool after straining  Plan:  Counseled to drink more water and eat more fiber  Continue taking Miralax as needed      3  Concussion without loss of consciousness, initial encounter  Assessment & Plan:  Patient hit the left side of his head after falling down the stairs two days ago but denies LOC, headache, vision changes  Reports fatigue and continued tenderness of left side of his head and the lateral right orbit but has not tried any OTC medication    Plan:  Tylenol or Motrin PRN for pain  Decrease screen time  Counseled patient that the pain may take up to 2 weeks to resolve        Subjective      Serina Racquel is a 25year-old male presenting for evaluation of pimple at the left base of his penis  He wears tight clothes at work that irritates it  Reports popping the pimple many times  He has used a razor to shave over it several times  He denies any bleeding or pus drainage  Two days ago, he fell down the stairs and hit the left side of his head  No LOC, persistent headache, vision changes, dizzy spells   Reports some pain around the lateral aspect of his left orbit  Left side of his head is   He has not tried Tylenol or Motrin to alleviate the pain  Also complains of some constipation over the last month, last bowel movement was last morning around 9 AM  Reports episodes of bloody stools after straining  Has been taking Miralax for constipation, which has been helping  Review of Systems   Constitutional: Negative for chills and fever  Gastrointestinal: Positive for anal bleeding and constipation  Genitourinary: Positive for genital sores  Neurological: Positive for headaches  No current outpatient medications on file prior to visit  Objective     /89 (BP Location: Right arm, Patient Position: Sitting, Cuff Size: Large)   Pulse (!) 110   Temp 98 °F (36 7 °C) (Temporal)   Ht 5' 8 6" (1 742 m)   Wt 63 7 kg (140 lb 6 4 oz)   SpO2 98%   BMI 20 98 kg/m²     Physical Exam  Constitutional:       General: He is not in acute distress  Appearance: Normal appearance  He is normal weight  He is not toxic-appearing  HENT:      Head: Normocephalic and atraumatic  Right Ear: External ear normal       Left Ear: External ear normal    Eyes:      General: No scleral icterus  Conjunctiva/sclera: Conjunctivae normal    Pulmonary:      Effort: No respiratory distress  Genitourinary:         Comments: Lesion at left lateral base of penis, erythematous, scabbed over; no drainage visible  Musculoskeletal:         General: No deformity  Skin:     General: Skin is warm and dry  Neurological:      General: No focal deficit present  Mental Status: He is alert and oriented to person, place, and time     Psychiatric:         Mood and Affect: Mood normal          Behavior: Behavior normal        Cinthya Ortiz,

## 2023-02-09 ENCOUNTER — OFFICE VISIT (OUTPATIENT)
Dept: FAMILY MEDICINE CLINIC | Facility: CLINIC | Age: 23
End: 2023-02-09

## 2023-02-09 VITALS
TEMPERATURE: 98.1 F | BODY MASS INDEX: 20.97 KG/M2 | HEIGHT: 69 IN | HEART RATE: 103 BPM | DIASTOLIC BLOOD PRESSURE: 82 MMHG | OXYGEN SATURATION: 100 % | WEIGHT: 141.6 LBS | RESPIRATION RATE: 18 BRPM | SYSTOLIC BLOOD PRESSURE: 128 MMHG

## 2023-02-09 DIAGNOSIS — K64.9 HEMORRHOIDS, UNSPECIFIED HEMORRHOID TYPE: Primary | ICD-10-CM

## 2023-02-09 RX ORDER — PSEUDOEPHEDRINE HCL 30 MG
100 TABLET ORAL 2 TIMES DAILY PRN
COMMUNITY
Start: 2023-02-01

## 2023-02-09 RX ORDER — DOXYCYCLINE 100 MG/1
TABLET ORAL
COMMUNITY
Start: 2023-02-08

## 2023-02-09 NOTE — PROGRESS NOTES
Name: Nelly Pritchett      : 2000      MRN: [id number]  Encounter Provider: Rebeca Wakefield MD  Encounter Date: 2023   Encounter department: [address] [place] [address]  Hemorrhoids, unspecified hemorrhoid type  Assessment & Plan:  Hx internal hemorrhoids  Recently some abd pain, pain with defecating, and blood in stool/when wiping  ED visit 23 diagnosed with acute proctitis and treated with doxycycline, currently day    Symptoms have improved  Minimal blood with wiping, pt states he still notices his hemorrhoids but improved  - can continue hemorrhoid cream prescribed previously  - recommend adequate hydration and fiber intake  Can use stool softeners as needed, recommend stopping if having multiple stools a day  - work note provided             Subjective      HPI   26 yo male presenting for ED follow up for rectal bleeding  ED diagnosed with acute proctitis and discharged on 7 days of doxycycline  Pt reports pain has improved and able to use the restroom more regularly now  Not too much blood when stooling, only noticed once when wiping two days ago  Now stooling regularly every day and has only taken the stool softener once  Wondering if he should still keep using the cream or stool softener, still noticing his hemorrhoids  Pt feels when he is laying on R side and breathing he feels pain at middle abdomen going up and going to his throat  Feels his throat is tightening, not painful like a sore throat  Does not have difficulty breathing  Does not feel it when walking and breathing  No SOB, CP  He is wondering if it is because of the antibiotic, otherwise he is not bothered by it  Review of Systems   Constitutional: Negative for chills and fever  HENT: Negative for ear pain and sore throat  Eyes: Negative for pain and visual disturbance  Respiratory: Negative for cough and shortness of breath      Cardiovascular: Negative for chest pain and palpitations  Gastrointestinal: Positive for anal bleeding and blood in stool  Negative for abdominal pain, constipation, diarrhea and vomiting  Genitourinary: Negative for dysuria and hematuria  Musculoskeletal: Negative for arthralgias and back pain  Skin: Negative for color change and rash  Neurological: Negative for seizures and syncope  Psychiatric/Behavioral: Negative for agitation and confusion  All other systems reviewed and are negative  Current Outpatient Medications on File Prior to Visit   Medication Sig   • benzoyl peroxide (BREVOXYL) 4 % external liquid Apply topically 2 (two) times a day for 10 days   • Docusate Sodium (DSS) 100 MG CAPS Take 100 mg by mouth 2 (two) times a day as needed   • doxycycline (ADOXA) 100 MG tablet    • mupirocin (BACTROBAN) 2 % ointment Apply topically 3 (three) times a day       Objective     /82 (BP Location: Left arm, Patient Position: Sitting, Cuff Size: Standard)   Pulse 103   Temp 98 1 °F (36 7 °C) (Temporal)   Resp 18   Ht 5' 8 6" (1 742 m)   Wt 64 2 kg (141 lb 9 6 oz)   SpO2 100%   BMI 21 16 kg/m²     Physical Exam  Vitals reviewed  Constitutional:       General: He is not in acute distress  Appearance: Normal appearance  He is well-developed  HENT:      Head: Normocephalic and atraumatic  Eyes:      Extraocular Movements: Extraocular movements intact  Conjunctiva/sclera: Conjunctivae normal    Cardiovascular:      Rate and Rhythm: Normal rate and regular rhythm  Heart sounds: Normal heart sounds  No murmur heard  Pulmonary:      Effort: Pulmonary effort is normal  No respiratory distress  Breath sounds: Normal breath sounds  No wheezing or rales  Abdominal:      General: Bowel sounds are normal  There is no distension  Palpations: Abdomen is soft  Tenderness: There is no abdominal tenderness  There is no right CVA tenderness or left CVA tenderness     Musculoskeletal: General: No tenderness or deformity  Normal range of motion  Cervical back: Normal range of motion and neck supple  Skin:     General: Skin is warm and dry  Findings: No rash  Neurological:      General: No focal deficit present  Mental Status: He is alert  Mental status is at baseline     Psychiatric:         Mood and Affect: Mood normal          Behavior: Behavior normal           Marianna Abdi MD

## 2023-02-09 NOTE — LETTER
February 9, 2023     Patient: Nitish Fuentes  YOB: 2000  Date of Visit: 2/9/2023      To Whom it May Concern:    Tricia Quan is under my professional care  Jose Alejandro Garciajean was seen in my office on 2/9/2023  Florencio Phalen may return to work on [employer] 2/13/2023  This is extended from his previous work note from the ED at [facility] on 2/7/23  If you have any questions or concerns, please don't hesitate to call           Sincerely,          Thee Oh MD        CC: No Recipients

## 2023-02-09 NOTE — ASSESSMENT & PLAN NOTE
Hx internal hemorrhoids  Recently some abd pain, pain with defecating, and blood in stool/when wiping  ED visit 2/7/23 diagnosed with acute proctitis and treated with doxycycline, currently day 2/7   Symptoms have improved  Minimal blood with wiping, pt states he still notices his hemorrhoids but improved  - can continue hemorrhoid cream prescribed previously  - recommend adequate hydration and fiber intake   Can use stool softeners as needed, recommend stopping if having multiple stools a day  - work note provided

## 2023-02-09 NOTE — PATIENT INSTRUCTIONS
Hemorrhoids   AMBULATORY CARE:   Hemorrhoids  are swollen blood vessels inside your rectum (internal hemorrhoids) or on your anus (external hemorrhoids)  Sometimes a hemorrhoid may prolapse  This means it extends out of your anus  Common symptoms include the following:   Pain or itching around your anus or inside your rectum    Swelling or bumps around your anus    Bright red blood in your bowel movement, on the toilet paper, or in the toilet bowl    Tissue bulging out of your anus (prolapsed hemorrhoids)    Incontinence (poor control over urine or bowel movements)    Seek care immediately if:   You have severe pain in your rectum or around your anus  You have severe pain in your abdomen and you are vomiting  You have bleeding from your anus that soaks through your underwear  Contact your healthcare provider if:   You have frequent and painful bowel movements  Your hemorrhoid looks or feels more swollen than usual      You do not have a bowel movement for 2 days or more  You see or feel tissue coming through your anus  You have questions or concerns about your condition or care  Treatment for hemorrhoids  may include medicines to decrease pain, swelling, and itching  The medicine may be a pill, pad, cream, or ointment  Medicine may also be given to soften your bowel movement  Surgery and other procedures may be needed to shrink or remove your hemorrhoids  Manage your symptoms:   Apply ice on your anus for 15 to 20 minutes every hour or as directed  Use an ice pack, or put crushed ice in a plastic bag  Cover it with a towel before you apply it to your anus  Ice helps prevent tissue damage and decreases swelling and pain  Take a sitz bath  Fill a bathtub with 4 to 6 inches of warm water  You may also use a sitz bath pan that fits inside a toilet bowl  Sit in the sitz bath for 15 minutes  Do this 3 times a day, and after each bowel movement   The warm water can help decrease pain and swelling  Keep your anal area clean  Gently wash the area with warm water daily  Soap may irritate the area  After a bowel movement, wipe with moist towelettes or wet toilet paper  Dry toilet paper can irritate the area  Prevent hemorrhoids:   Do not strain to have a bowel movement  Do not sit on the toilet too long  These actions can increase pressure on the tissues in your rectum and anus  Drink plenty of liquids  Liquids can help prevent constipation  Ask how much liquid to drink each day and which liquids are best for you  Eat a variety of high-fiber foods  Examples include fruits, vegetables, and whole grains  Ask your healthcare provider how much fiber you need each day  You may need to take a fiber supplement  Exercise as directed  Exercise, such as walking, may make it easier to have a bowel movement  Ask your healthcare provider to help you create an exercise plan  Do not have anal sex  Anal sex can weaken the skin around your rectum and anus  Avoid heavy lifting  This can cause straining and increase your risk for another hemorrhoid  Follow up with your doctor as directed:  Write down your questions so you remember to ask them during your visits  © Copyright [employer] 2022 Information is for End User's use only and may not be sold, redistributed or otherwise used for commercial purposes  All illustrations and images included in CareNotes® are the copyrighted property of [employer]  or Kevin Bentley  The above information is an  only  It is not intended as medical advice for individual conditions or treatments  Talk to your doctor, nurse or pharmacist before following any medical regimen to see if it is safe and effective for you

## 2023-02-16 ENCOUNTER — TELEPHONE (OUTPATIENT)
Dept: FAMILY MEDICINE CLINIC | Facility: CLINIC | Age: 23
End: 2023-02-16

## 2023-02-16 DIAGNOSIS — R79.89 ABNORMAL CBC: Primary | ICD-10-CM

## 2023-02-17 NOTE — TELEPHONE ENCOUNTER
Reviewed results and called patient to discuss  Explained that he will need to complete a peripheral smear and follow up with hematology as soon as possible  Orders have been placed

## 2023-02-20 ENCOUNTER — APPOINTMENT (OUTPATIENT)
Dept: LAB | Facility: CLINIC | Age: 23
End: 2023-02-20

## 2023-02-20 DIAGNOSIS — R79.89 ABNORMAL CBC: ICD-10-CM

## 2023-02-20 LAB
IMM EOSINOPHIL NFR BLD MANUAL: 1 % (ref 0–6)
LYMPHOCYTES NFR BLD: 54 % (ref 14–44)
MONOCYTES NFR BLD AUTO: 5 % (ref 4–12)
NEUTS BAND NFR BLD MANUAL: 7 THOUSAND/UL
NEUTS SEG NFR BLD AUTO: 31 % (ref 45–77)
PLATELET BLD QL SMEAR: ADEQUATE
RBC MORPH BLD: NORMAL
TOTAL CELLS COUNTED SPEC: 100
VARIANT LYMPHS BLD QL SMEAR: 2 % (ref 0–0)

## 2023-02-21 ENCOUNTER — TELEPHONE (OUTPATIENT)
Dept: FAMILY MEDICINE CLINIC | Facility: CLINIC | Age: 23
End: 2023-02-21

## 2023-02-21 DIAGNOSIS — R79.89 ABNORMAL CBC: Primary | ICD-10-CM

## 2023-02-21 NOTE — TELEPHONE ENCOUNTER
Called patient to review recent labs  Upon further chart review, CBC w/ diff that was completed on 2/08/23 was corrected on 2/16/23  Per correction, there are no blasts seen on the diff - only reactive lymphocytes which correlate with episode of proctitis and treatment with abx at that time  Will repeat CBC w/ diff next week and re-evaluate need for hematology appointment at that time

## 2023-03-13 ENCOUNTER — TELEPHONE (OUTPATIENT)
Dept: FAMILY MEDICINE CLINIC | Facility: CLINIC | Age: 23
End: 2023-03-13

## 2023-03-13 NOTE — LETTER
[address] [place] [address]  [address] [facility] [address]  Phone#  [phone]  Fax#  [phone]    March 14, 2023    Steve Brunner  2000  [address] [facility] [address] [facility]    Dear Fish Diehl had 1st no-show appointments at [address] [place] [address]  After each no-show, the office will send a letter warning you that excessive no-shows may result in your dismissal as a patient from the [address] [place] [address]    An appointment is considered a "no-show" when a patient does not arrive for their scheduled appointment and has not called the practice at least two (2) hours before their scheduled appointment to either reschedule or cancel the appointment  A "no-show" can also occur when an appointment is canceled due to the patient arriving fifteen (15) minutes or more past the scheduled time of their appointment  In accordance with the [facility]'s "No-Show and Patient Dismissal Policy", a patient may be dismissed from any [facility], at the discretion of the Practice Administrator and [address] [place] [address], after a patient accumulates three (3) consecutive no-show appointments  In the future, we request that you call the office at least two (2) hours before your scheduled appointment to reschedule or cancel the appointment and avoid another no-show  If you need assistance with keeping scheduled appointments for any reason, speak to a member of your care team  [facility] has dedicated personnel that can connect you with resources       In order to assure quality and comprehensive care in a timely manner for all patients, to meet the needs of our patient population, and assist in any possible appropriate scheduling and receipt of care, the [facility] practices encourage all patients to arrive fifteen (15) minutes prior to their scheduled appointment      Respectfully,   Practice Administrator

## 2023-03-21 ENCOUNTER — TELEPHONE (OUTPATIENT)
Dept: HEMATOLOGY ONCOLOGY | Facility: CLINIC | Age: 23
End: 2023-03-21

## 2023-03-28 ENCOUNTER — TELEPHONE (OUTPATIENT)
Dept: HEMATOLOGY ONCOLOGY | Facility: CLINIC | Age: 23
End: 2023-03-28

## 2024-02-21 PROBLEM — Z00.00 HEALTH CARE MAINTENANCE: Status: RESOLVED | Noted: 2019-05-30 | Resolved: 2024-02-21
